# Patient Record
Sex: MALE | Race: WHITE | NOT HISPANIC OR LATINO
[De-identification: names, ages, dates, MRNs, and addresses within clinical notes are randomized per-mention and may not be internally consistent; named-entity substitution may affect disease eponyms.]

---

## 2017-01-24 ENCOUNTER — APPOINTMENT (OUTPATIENT)
Dept: ENDOCRINOLOGY | Facility: CLINIC | Age: 42
End: 2017-01-24

## 2017-01-24 VITALS
OXYGEN SATURATION: 99 % | HEIGHT: 66 IN | SYSTOLIC BLOOD PRESSURE: 112 MMHG | BODY MASS INDEX: 23.63 KG/M2 | DIASTOLIC BLOOD PRESSURE: 76 MMHG | HEART RATE: 73 BPM | WEIGHT: 147 LBS

## 2017-01-24 DIAGNOSIS — R19.5 OTHER FECAL ABNORMALITIES: ICD-10-CM

## 2017-01-24 LAB
GLUCOSE BLDC GLUCOMTR-MCNC: 111
HBA1C MFR BLD HPLC: 5.7

## 2017-01-25 PROBLEM — R19.5 LOOSE STOOLS: Status: ACTIVE | Noted: 2017-01-25

## 2017-05-08 ENCOUNTER — APPOINTMENT (OUTPATIENT)
Dept: ENDOCRINOLOGY | Facility: CLINIC | Age: 42
End: 2017-05-08

## 2017-05-08 VITALS
WEIGHT: 146 LBS | BODY MASS INDEX: 23.46 KG/M2 | HEIGHT: 66 IN | SYSTOLIC BLOOD PRESSURE: 114 MMHG | HEART RATE: 70 BPM | DIASTOLIC BLOOD PRESSURE: 76 MMHG | OXYGEN SATURATION: 98 %

## 2017-05-08 LAB
GLUCOSE BLDC GLUCOMTR-MCNC: 130
HBA1C MFR BLD HPLC: 6.1

## 2017-05-12 LAB
ANION GAP SERPL CALC-SCNC: 25 MMOL/L
BUN SERPL-MCNC: 21 MG/DL
CALCIUM SERPL-MCNC: 10 MG/DL
CHLORIDE SERPL-SCNC: 99 MMOL/L
CO2 SERPL-SCNC: 19 MMOL/L
CREAT SERPL-MCNC: 1.03 MG/DL
GLUCOSE SERPL-MCNC: 119 MG/DL
POTASSIUM SERPL-SCNC: 4.5 MMOL/L
SODIUM SERPL-SCNC: 143 MMOL/L

## 2017-05-16 ENCOUNTER — MEDICATION RENEWAL (OUTPATIENT)
Age: 42
End: 2017-05-16

## 2017-05-23 ENCOUNTER — MEDICATION RENEWAL (OUTPATIENT)
Age: 42
End: 2017-05-23

## 2017-06-29 ENCOUNTER — MEDICATION RENEWAL (OUTPATIENT)
Age: 42
End: 2017-06-29

## 2017-06-30 ENCOUNTER — MEDICATION RENEWAL (OUTPATIENT)
Age: 42
End: 2017-06-30

## 2017-06-30 RX ORDER — LANCETS 33 GAUGE
EACH MISCELLANEOUS
Qty: 6 | Refills: 3 | Status: DISCONTINUED | COMMUNITY
Start: 2017-06-29 | End: 2017-06-30

## 2017-08-28 ENCOUNTER — APPOINTMENT (OUTPATIENT)
Dept: ENDOCRINOLOGY | Facility: CLINIC | Age: 42
End: 2017-08-28
Payer: COMMERCIAL

## 2017-08-28 VITALS
DIASTOLIC BLOOD PRESSURE: 64 MMHG | HEART RATE: 74 BPM | HEIGHT: 66 IN | OXYGEN SATURATION: 98 % | SYSTOLIC BLOOD PRESSURE: 100 MMHG

## 2017-08-28 LAB
GLUCOSE BLDC GLUCOMTR-MCNC: 141
HBA1C MFR BLD HPLC: 5.9

## 2017-08-28 PROCEDURE — 82962 GLUCOSE BLOOD TEST: CPT

## 2017-08-28 PROCEDURE — 99214 OFFICE O/P EST MOD 30 MIN: CPT | Mod: 25

## 2017-08-28 PROCEDURE — 95251 CONT GLUC MNTR ANALYSIS I&R: CPT

## 2017-08-28 PROCEDURE — 83036 HEMOGLOBIN GLYCOSYLATED A1C: CPT | Mod: QW

## 2017-09-05 ENCOUNTER — MEDICATION RENEWAL (OUTPATIENT)
Age: 42
End: 2017-09-05

## 2017-09-24 ENCOUNTER — MEDICATION RENEWAL (OUTPATIENT)
Age: 42
End: 2017-09-24

## 2017-10-09 ENCOUNTER — OTHER (OUTPATIENT)
Age: 42
End: 2017-10-09

## 2017-10-16 RX ORDER — INSULIN DEGLUDEC INJECTION 200 U/ML
200 INJECTION, SOLUTION SUBCUTANEOUS
Qty: 1 | Refills: 2 | Status: DISCONTINUED | COMMUNITY
Start: 2017-05-16 | End: 2017-10-16

## 2017-11-06 ENCOUNTER — RX RENEWAL (OUTPATIENT)
Age: 42
End: 2017-11-06

## 2017-11-07 ENCOUNTER — RX RENEWAL (OUTPATIENT)
Age: 42
End: 2017-11-07

## 2017-11-10 ENCOUNTER — CLINICAL ADVICE (OUTPATIENT)
Age: 42
End: 2017-11-10

## 2017-11-21 ENCOUNTER — MEDICATION RENEWAL (OUTPATIENT)
Age: 42
End: 2017-11-21

## 2017-11-28 ENCOUNTER — APPOINTMENT (OUTPATIENT)
Dept: ENDOCRINOLOGY | Facility: CLINIC | Age: 42
End: 2017-11-28
Payer: COMMERCIAL

## 2017-11-28 VITALS
WEIGHT: 145 LBS | HEIGHT: 66 IN | OXYGEN SATURATION: 98 % | DIASTOLIC BLOOD PRESSURE: 72 MMHG | HEART RATE: 77 BPM | BODY MASS INDEX: 23.3 KG/M2 | SYSTOLIC BLOOD PRESSURE: 110 MMHG

## 2017-11-28 LAB
GLUCOSE BLDC GLUCOMTR-MCNC: 105
HBA1C MFR BLD HPLC: 5.7

## 2017-11-28 PROCEDURE — 99214 OFFICE O/P EST MOD 30 MIN: CPT | Mod: 25

## 2017-11-28 PROCEDURE — 82962 GLUCOSE BLOOD TEST: CPT

## 2017-11-28 PROCEDURE — 95251 CONT GLUC MNTR ANALYSIS I&R: CPT

## 2017-11-28 PROCEDURE — 83036 HEMOGLOBIN GLYCOSYLATED A1C: CPT | Mod: QW

## 2017-11-29 LAB
CHOLEST SERPL-MCNC: 145 MG/DL
CHOLEST/HDLC SERPL: 1.8 RATIO
CREAT SPEC-SCNC: 114 MG/DL
HDLC SERPL-MCNC: 82 MG/DL
LDLC SERPL CALC-MCNC: 53 MG/DL
MICROALBUMIN 24H UR DL<=1MG/L-MCNC: 0.8 MG/DL
MICROALBUMIN/CREAT 24H UR-RTO: 7 MG/G
TRIGL SERPL-MCNC: 49 MG/DL
TSH SERPL-ACNC: 1.47 UIU/ML

## 2017-12-18 LAB
ALBUMIN SERPL ELPH-MCNC: 4.8 G/DL
ALP BLD-CCNC: 101 U/L
ALT SERPL-CCNC: 33 U/L
ANION GAP SERPL CALC-SCNC: 17 MMOL/L
AST SERPL-CCNC: 27 U/L
BILIRUB SERPL-MCNC: 0.7 MG/DL
BUN SERPL-MCNC: 22 MG/DL
CALCIUM SERPL-MCNC: 10 MG/DL
CHLORIDE SERPL-SCNC: 99 MMOL/L
CO2 SERPL-SCNC: 27 MMOL/L
CREAT SERPL-MCNC: 1.2 MG/DL
GLUCOSE SERPL-MCNC: 66 MG/DL
POTASSIUM SERPL-SCNC: 4.2 MMOL/L
PROT SERPL-MCNC: 7.1 G/DL
SODIUM SERPL-SCNC: 143 MMOL/L

## 2017-12-26 ENCOUNTER — CLINICAL ADVICE (OUTPATIENT)
Age: 42
End: 2017-12-26

## 2018-01-22 ENCOUNTER — MEDICATION RENEWAL (OUTPATIENT)
Age: 43
End: 2018-01-22

## 2018-02-07 ENCOUNTER — RX RENEWAL (OUTPATIENT)
Age: 43
End: 2018-02-07

## 2018-02-08 ENCOUNTER — OTHER (OUTPATIENT)
Age: 43
End: 2018-02-08

## 2018-02-08 RX ORDER — INSULIN PEN,REUSABLE,BT LISPRO
INSULIN PEN (EA) SUBCUTANEOUS
Qty: 1 | Refills: 0 | Status: ACTIVE | COMMUNITY
Start: 2018-02-07

## 2018-02-14 ENCOUNTER — APPOINTMENT (OUTPATIENT)
Dept: ENDOCRINOLOGY | Facility: CLINIC | Age: 43
End: 2018-02-14
Payer: COMMERCIAL

## 2018-02-14 ENCOUNTER — RESULT CHARGE (OUTPATIENT)
Age: 43
End: 2018-02-14

## 2018-02-14 LAB
GLUCOSE BLDC GLUCOMTR-MCNC: 134
HBA1C MFR BLD HPLC: 5.7

## 2018-02-14 PROCEDURE — 83036 HEMOGLOBIN GLYCOSYLATED A1C: CPT | Mod: QW

## 2018-02-14 PROCEDURE — G0108 DIAB MANAGE TRN  PER INDIV: CPT

## 2018-02-14 PROCEDURE — 82962 GLUCOSE BLOOD TEST: CPT

## 2018-02-14 RX ORDER — BLOOD-GLUCOSE SENSOR
EACH MISCELLANEOUS
Qty: 4 | Refills: 3 | Status: ACTIVE | COMMUNITY
Start: 2018-01-22 | End: 1900-01-01

## 2018-02-14 RX ORDER — BLOOD-GLUCOSE TRANSMITTER
EACH MISCELLANEOUS
Qty: 1 | Refills: 3 | Status: ACTIVE | COMMUNITY
Start: 2017-09-05 | End: 1900-01-01

## 2018-02-28 ENCOUNTER — RX RENEWAL (OUTPATIENT)
Age: 43
End: 2018-02-28

## 2018-02-28 ENCOUNTER — OTHER (OUTPATIENT)
Age: 43
End: 2018-02-28

## 2018-02-28 RX ORDER — INSULIN DEGLUDEC INJECTION 100 U/ML
100 INJECTION, SOLUTION SUBCUTANEOUS
Qty: 1 | Refills: 3 | Status: ACTIVE | COMMUNITY
Start: 2017-10-09 | End: 1900-01-01

## 2018-03-14 ENCOUNTER — RESULT REVIEW (OUTPATIENT)
Age: 43
End: 2018-03-14

## 2018-04-03 ENCOUNTER — RX RENEWAL (OUTPATIENT)
Age: 43
End: 2018-04-03

## 2018-04-04 ENCOUNTER — CLINICAL ADVICE (OUTPATIENT)
Age: 43
End: 2018-04-04

## 2018-05-22 ENCOUNTER — APPOINTMENT (OUTPATIENT)
Dept: ENDOCRINOLOGY | Facility: CLINIC | Age: 43
End: 2018-05-22
Payer: COMMERCIAL

## 2018-05-22 VITALS
OXYGEN SATURATION: 98 % | HEART RATE: 78 BPM | SYSTOLIC BLOOD PRESSURE: 110 MMHG | BODY MASS INDEX: 23.3 KG/M2 | HEIGHT: 66 IN | WEIGHT: 145 LBS | DIASTOLIC BLOOD PRESSURE: 76 MMHG

## 2018-05-22 LAB
GLUCOSE BLDC GLUCOMTR-MCNC: 185
HBA1C MFR BLD HPLC: 5.6

## 2018-05-22 PROCEDURE — 95251 CONT GLUC MNTR ANALYSIS I&R: CPT

## 2018-05-22 PROCEDURE — 82962 GLUCOSE BLOOD TEST: CPT

## 2018-05-22 PROCEDURE — 99214 OFFICE O/P EST MOD 30 MIN: CPT | Mod: 25

## 2018-05-22 PROCEDURE — 83036 HEMOGLOBIN GLYCOSYLATED A1C: CPT | Mod: QW

## 2018-05-22 RX ORDER — ASCORBIC ACID 500 MG
TABLET ORAL
Refills: 0 | Status: ACTIVE | COMMUNITY

## 2018-05-22 RX ORDER — FINASTERIDE 1 MG/1
TABLET, FILM COATED ORAL
Refills: 0 | Status: ACTIVE | COMMUNITY

## 2018-05-22 RX ORDER — MONTELUKAST 10 MG/1
10 TABLET, FILM COATED ORAL
Refills: 0 | Status: ACTIVE | COMMUNITY

## 2018-05-25 LAB
25(OH)D3 SERPL-MCNC: 15.9 NG/ML
ANION GAP SERPL CALC-SCNC: 14 MMOL/L
BUN SERPL-MCNC: 22 MG/DL
CALCIUM SERPL-MCNC: 9.6 MG/DL
CHLORIDE SERPL-SCNC: 99 MMOL/L
CO2 SERPL-SCNC: 27 MMOL/L
CREAT SERPL-MCNC: 1.13 MG/DL
GLUCOSE SERPL-MCNC: 154 MG/DL
POTASSIUM SERPL-SCNC: 4.4 MMOL/L
SODIUM SERPL-SCNC: 140 MMOL/L

## 2018-05-25 RX ORDER — ERGOCALCIFEROL 1.25 MG/1
1.25 MG CAPSULE, LIQUID FILLED ORAL
Qty: 13 | Refills: 3 | Status: ACTIVE | COMMUNITY
Start: 2018-05-25 | End: 1900-01-01

## 2018-06-08 ENCOUNTER — MEDICATION RENEWAL (OUTPATIENT)
Age: 43
End: 2018-06-08

## 2018-06-18 ENCOUNTER — RX RENEWAL (OUTPATIENT)
Age: 43
End: 2018-06-18

## 2018-07-02 ENCOUNTER — RX RENEWAL (OUTPATIENT)
Age: 43
End: 2018-07-02

## 2018-07-02 RX ORDER — LANCETS 30 GAUGE
EACH MISCELLANEOUS
Qty: 600 | Refills: 3 | Status: ACTIVE | COMMUNITY
Start: 2018-07-02 | End: 1900-01-01

## 2018-08-28 ENCOUNTER — RX RENEWAL (OUTPATIENT)
Age: 43
End: 2018-08-28

## 2018-09-06 ENCOUNTER — APPOINTMENT (OUTPATIENT)
Dept: ENDOCRINOLOGY | Facility: CLINIC | Age: 43
End: 2018-09-06
Payer: COMMERCIAL

## 2018-09-06 VITALS
WEIGHT: 144 LBS | DIASTOLIC BLOOD PRESSURE: 72 MMHG | BODY MASS INDEX: 23.14 KG/M2 | HEIGHT: 66 IN | HEART RATE: 80 BPM | SYSTOLIC BLOOD PRESSURE: 110 MMHG | OXYGEN SATURATION: 98 %

## 2018-09-06 LAB
GLUCOSE BLDC GLUCOMTR-MCNC: 166
HBA1C MFR BLD HPLC: 5.8

## 2018-09-06 PROCEDURE — 82962 GLUCOSE BLOOD TEST: CPT

## 2018-09-06 PROCEDURE — 83036 HEMOGLOBIN GLYCOSYLATED A1C: CPT | Mod: QW

## 2018-09-06 PROCEDURE — 99214 OFFICE O/P EST MOD 30 MIN: CPT | Mod: 25

## 2018-10-25 ENCOUNTER — MEDICATION RENEWAL (OUTPATIENT)
Age: 43
End: 2018-10-25

## 2018-12-06 ENCOUNTER — APPOINTMENT (OUTPATIENT)
Dept: ENDOCRINOLOGY | Facility: CLINIC | Age: 43
End: 2018-12-06
Payer: COMMERCIAL

## 2018-12-06 VITALS
HEART RATE: 72 BPM | OXYGEN SATURATION: 98 % | DIASTOLIC BLOOD PRESSURE: 70 MMHG | HEIGHT: 66 IN | SYSTOLIC BLOOD PRESSURE: 120 MMHG | BODY MASS INDEX: 23.14 KG/M2 | WEIGHT: 144 LBS

## 2018-12-06 LAB
GLUCOSE BLDC GLUCOMTR-MCNC: 164
HBA1C MFR BLD HPLC: 5.3

## 2018-12-06 PROCEDURE — 99214 OFFICE O/P EST MOD 30 MIN: CPT | Mod: 25

## 2018-12-06 PROCEDURE — 82962 GLUCOSE BLOOD TEST: CPT

## 2018-12-06 PROCEDURE — 83036 HEMOGLOBIN GLYCOSYLATED A1C: CPT | Mod: QW

## 2018-12-06 PROCEDURE — 95251 CONT GLUC MNTR ANALYSIS I&R: CPT

## 2018-12-10 LAB
25(OH)D3 SERPL-MCNC: 33.3 NG/ML
ALBUMIN SERPL ELPH-MCNC: 4.9 G/DL
ALP BLD-CCNC: 119 U/L
ALT SERPL-CCNC: 30 U/L
ANION GAP SERPL CALC-SCNC: 10 MMOL/L
AST SERPL-CCNC: 27 U/L
BILIRUB SERPL-MCNC: 0.6 MG/DL
BUN SERPL-MCNC: 21 MG/DL
CALCIUM SERPL-MCNC: 9.9 MG/DL
CHLORIDE SERPL-SCNC: 101 MMOL/L
CHOLEST SERPL-MCNC: 133 MG/DL
CHOLEST/HDLC SERPL: 1.6 RATIO
CO2 SERPL-SCNC: 30 MMOL/L
CREAT SERPL-MCNC: 0.9 MG/DL
CREAT SPEC-SCNC: 119 MG/DL
GLUCOSE SERPL-MCNC: 135 MG/DL
HDLC SERPL-MCNC: 81 MG/DL
LDLC SERPL CALC-MCNC: 39 MG/DL
MICROALBUMIN 24H UR DL<=1MG/L-MCNC: <1.2 MG/DL
MICROALBUMIN/CREAT 24H UR-RTO: NORMAL
POTASSIUM SERPL-SCNC: 4.1 MMOL/L
PROT SERPL-MCNC: 7 G/DL
SODIUM SERPL-SCNC: 140 MMOL/L
TRIGL SERPL-MCNC: 65 MG/DL
TSH SERPL-ACNC: 1.5 UIU/ML

## 2019-01-17 ENCOUNTER — CLINICAL ADVICE (OUTPATIENT)
Age: 44
End: 2019-01-17

## 2019-01-18 ENCOUNTER — CLINICAL ADVICE (OUTPATIENT)
Age: 44
End: 2019-01-18

## 2019-02-14 ENCOUNTER — APPOINTMENT (OUTPATIENT)
Dept: ENDOCRINOLOGY | Facility: CLINIC | Age: 44
End: 2019-02-14

## 2019-03-07 ENCOUNTER — APPOINTMENT (OUTPATIENT)
Dept: ENDOCRINOLOGY | Facility: CLINIC | Age: 44
End: 2019-03-07
Payer: COMMERCIAL

## 2019-03-07 VITALS
DIASTOLIC BLOOD PRESSURE: 74 MMHG | SYSTOLIC BLOOD PRESSURE: 120 MMHG | BODY MASS INDEX: 22.98 KG/M2 | HEIGHT: 66 IN | WEIGHT: 143 LBS | OXYGEN SATURATION: 98 % | HEART RATE: 69 BPM

## 2019-03-07 LAB — HBA1C MFR BLD HPLC: 5.7

## 2019-03-07 PROCEDURE — 99214 OFFICE O/P EST MOD 30 MIN: CPT | Mod: 25

## 2019-03-07 PROCEDURE — 82962 GLUCOSE BLOOD TEST: CPT

## 2019-03-07 PROCEDURE — 83036 HEMOGLOBIN GLYCOSYLATED A1C: CPT | Mod: QW

## 2019-03-07 PROCEDURE — 95251 CONT GLUC MNTR ANALYSIS I&R: CPT

## 2019-03-07 NOTE — HISTORY OF PRESENT ILLNESS
[FreeTextEntry1] : cc: diabetes\par \par 44 year old man with T1DM, diagnosed in 3/2016, on basal bolus insulin with Tresiba 9 units and humalog i:c 1:15,finds half-unit dosing very helpful.  Does not want pump.  Using Dexcom sensor, still having some difficulty with the G6,  less pain, still does not last the full 10 days.     He checks blood glucose    3-8 times daily.  Values have generally been 90 - 150 mg/dl.  Has rare hypoglycemia, does not always feel it.   Limits carbohydrate intake.  Exercises most days ( less so in the past few months because traveling.  Saw optho  in the spring, no retinopathy. Had flu shot \par \par \par Hyperlipidemia - taking statin, reports no side effects\par \par Vit D def'y - taking weekly supplement\par \par Will be moving to [place] this summer\par \par

## 2019-03-07 NOTE — ASSESSMENT
[FreeTextEntry1] : \par 44 year old man with T1DM, well controlled, \par   - Dexcom sensor downloaded and reviewed. Pt with good glycemic control, has occasional postprandial elevations after dinner, recommend monitor diet and consider increased insulin for specific foods/meals that lead to hyperglycemia, otherwise Continue current insulin regimen. \par   - blood pressure at goal, microalbumin neg\par   - has glucagon, ketone strips\par    - retinopathy screening up to date, no retinopathy\par  - had flu shot\par \par  Hyperlipidemia -  continue atorvastatin\par \par VItamin D def'y - decrease vitamin D to 1000 units daily, repeat level next visit\par \par f/u 3 months \par

## 2019-03-08 ENCOUNTER — RESULT CHARGE (OUTPATIENT)
Age: 44
End: 2019-03-08

## 2019-03-08 LAB — GLUCOSE BLDC GLUCOMTR-MCNC: 181

## 2019-03-14 ENCOUNTER — RX RENEWAL (OUTPATIENT)
Age: 44
End: 2019-03-14

## 2019-04-21 ENCOUNTER — TRANSCRIPTION ENCOUNTER (OUTPATIENT)
Age: 44
End: 2019-04-21

## 2019-06-11 ENCOUNTER — APPOINTMENT (OUTPATIENT)
Dept: ENDOCRINOLOGY | Facility: CLINIC | Age: 44
End: 2019-06-11
Payer: COMMERCIAL

## 2019-06-11 VITALS
BODY MASS INDEX: 23.3 KG/M2 | DIASTOLIC BLOOD PRESSURE: 60 MMHG | HEIGHT: 66 IN | OXYGEN SATURATION: 98 % | WEIGHT: 145 LBS | HEART RATE: 65 BPM | SYSTOLIC BLOOD PRESSURE: 110 MMHG

## 2019-06-11 DIAGNOSIS — E55.9 VITAMIN D DEFICIENCY, UNSPECIFIED: ICD-10-CM

## 2019-06-11 DIAGNOSIS — E78.5 HYPERLIPIDEMIA, UNSPECIFIED: ICD-10-CM

## 2019-06-11 DIAGNOSIS — E10.9 TYPE 1 DIABETES MELLITUS W/OUT COMPLICATIONS: ICD-10-CM

## 2019-06-11 DIAGNOSIS — Z00.00 ENCOUNTER FOR GENERAL ADULT MEDICAL EXAMINATION W/OUT ABNORMAL FINDINGS: ICD-10-CM

## 2019-06-11 PROCEDURE — 95251 CONT GLUC MNTR ANALYSIS I&R: CPT

## 2019-06-11 PROCEDURE — 83036 HEMOGLOBIN GLYCOSYLATED A1C: CPT | Mod: QW

## 2019-06-11 PROCEDURE — 99214 OFFICE O/P EST MOD 30 MIN: CPT | Mod: 25

## 2019-06-11 PROCEDURE — 82962 GLUCOSE BLOOD TEST: CPT

## 2019-06-12 PROBLEM — E55.9 VITAMIN D INSUFFICIENCY: Status: ACTIVE | Noted: 2018-05-25

## 2019-06-12 LAB
25(OH)D3 SERPL-MCNC: 24.9 NG/ML
ALBUMIN SERPL ELPH-MCNC: 4.9 G/DL
ALP BLD-CCNC: 98 U/L
ALT SERPL-CCNC: 27 U/L
ANION GAP SERPL CALC-SCNC: 14 MMOL/L
AST SERPL-CCNC: 31 U/L
BILIRUB SERPL-MCNC: 0.7 MG/DL
BUN SERPL-MCNC: 25 MG/DL
CALCIUM SERPL-MCNC: 9.6 MG/DL
CHLORIDE SERPL-SCNC: 101 MMOL/L
CHOLEST SERPL-MCNC: 124 MG/DL
CHOLEST/HDLC SERPL: 1.6 RATIO
CO2 SERPL-SCNC: 27 MMOL/L
CREAT SERPL-MCNC: 1.06 MG/DL
GLUCOSE BLDC GLUCOMTR-MCNC: 97
GLUCOSE SERPL-MCNC: 94 MG/DL
HBA1C MFR BLD HPLC: 5.5
HDLC SERPL-MCNC: 78 MG/DL
LDLC SERPL CALC-MCNC: 36 MG/DL
MEV IGG FLD QL IA: 194 AU/ML
MEV IGG+IGM SER-IMP: POSITIVE
POTASSIUM SERPL-SCNC: 4.2 MMOL/L
PROT SERPL-MCNC: 6.5 G/DL
SODIUM SERPL-SCNC: 142 MMOL/L
TRIGL SERPL-MCNC: 52 MG/DL

## 2019-06-12 NOTE — ASSESSMENT
[FreeTextEntry1] : \par 44 year old man with T1DM, well controlled, \par   - Reviewed hypoglycemia, (beers, exercise,) continue current settings\par \par \par   - Dexcom sensor downloaded and reviewed. Pt with excellent glycemic control, has occasional hypoglycemia ( after exercise, evening beer)  reviewed effects of alcohol and exercise on blood glucose.  Continue current insulin regimen. \par   - blood pressure at goal, microalbumin neg\par   - has glucagon, ketone strips\par    - retinopathy screening up to date, no retinopathy\par  - had flu shot\par  - check cmp\par \par  Hyperlipidemia -  continue atorvastatin\par \par VItamin D def'y - check vitamin D\par \par Health maint - check measles titer\par \par f/u 3 months \par

## 2019-06-12 NOTE — PHYSICAL EXAM
[Alert] : alert [No Acute Distress] : no acute distress [Well Developed] : well developed [Normal Sclera/Conjunctiva] : normal sclera/conjunctiva [Well Nourished] : well nourished [No LAD] : no lymphadenopathy [No Proptosis] : no proptosis [Thyroid Not Enlarged] : the thyroid was not enlarged [Regular Rhythm] : with a regular rhythm [Normal S1, S2] : normal S1 and S2 [Pedal Pulses Normal] : the pedal pulses are present [No Edema] : there was no peripheral edema [Normal Bowel Sounds] : normal bowel sounds [Not Tender] : non-tender [Soft] : abdomen soft [No Spinal Tenderness] : no spinal tenderness [Normal Strength/Tone] : muscle strength and tone were normal [Right Foot Was Examined] : right foot ~C was examined [No Tremors] : no tremors [Normal Reflexes] : deep tendon reflexes were 2+ and symmetric [Left Foot Was Examined] : left foot ~C was examined [Normal Affect] : the affect was normal [Normal Mood] : the mood was normal [Normal Sensation on Monofilament Testing] : normal sensation on monofilament testing of lower extremities [Foot Ulcers] : no foot ulcers

## 2019-06-12 NOTE — HISTORY OF PRESENT ILLNESS
[FreeTextEntry1] : cc: diabetes\par \par 44 year old man with T1DM, diagnosed in 3/2016, on basal bolus insulin, does not want pump, using Tresiba 9 units and humalog i:c 1:15,(finds half-unit dosing helpful).  Does not want pump.  Using Dexcom sensor, now doing better with the G6,  has tried new technique, angles it. . Has been 'in range+ %.  Has not been traveling and his routine has been stable.  Almost no hypoglycemia, had one episode yesterday when he played tennis at night.   Limits carbohydrate intake.  Exercises every day   Saw optho earlier this year, no retinopathy. Had flu shot. \par \par \par Hyperlipidemia - taking statin, reports no side effects\par \par Vit D def'y - taking vitamin D daily supplements\par \par Will be moving to [place] July 4th ( will be returning for appts)\par \par

## 2019-07-29 ENCOUNTER — MEDICATION RENEWAL (OUTPATIENT)
Age: 44
End: 2019-07-29

## 2019-07-29 RX ORDER — LANCETS 33 GAUGE
EACH MISCELLANEOUS
Qty: 900 | Refills: 3 | Status: ACTIVE | COMMUNITY
Start: 2019-07-29 | End: 1900-01-01

## 2019-07-29 RX ORDER — INSULIN LISPRO 100 [IU]/ML
100 INJECTION, SOLUTION SUBCUTANEOUS
Qty: 2 | Refills: 3 | Status: ACTIVE | COMMUNITY
Start: 2017-05-23 | End: 1900-01-01

## 2019-07-29 RX ORDER — INSULIN DEGLUDEC INJECTION 100 U/ML
100 INJECTION, SOLUTION SUBCUTANEOUS
Qty: 2 | Refills: 3 | Status: ACTIVE | COMMUNITY
Start: 2018-02-28 | End: 1900-01-01

## 2019-10-07 ENCOUNTER — APPOINTMENT (OUTPATIENT)
Dept: ENDOCRINOLOGY | Facility: CLINIC | Age: 44
End: 2019-10-07

## 2019-10-21 ENCOUNTER — MEDICATION RENEWAL (OUTPATIENT)
Age: 44
End: 2019-10-21

## 2020-02-25 ENCOUNTER — RX RENEWAL (OUTPATIENT)
Age: 45
End: 2020-02-25

## 2020-08-11 ENCOUNTER — RX RENEWAL (OUTPATIENT)
Age: 45
End: 2020-08-11

## 2020-11-07 ENCOUNTER — RX RENEWAL (OUTPATIENT)
Age: 45
End: 2020-11-07

## 2021-08-11 ENCOUNTER — NON-APPOINTMENT (OUTPATIENT)
Age: 46
End: 2021-08-11

## 2021-08-16 ENCOUNTER — APPOINTMENT (OUTPATIENT)
Dept: ORTHOPEDIC SURGERY | Facility: CLINIC | Age: 46
End: 2021-08-16
Payer: COMMERCIAL

## 2021-08-16 ENCOUNTER — NON-APPOINTMENT (OUTPATIENT)
Age: 46
End: 2021-08-16

## 2021-08-16 VITALS
DIASTOLIC BLOOD PRESSURE: 72 MMHG | WEIGHT: 150 LBS | BODY MASS INDEX: 24.11 KG/M2 | HEIGHT: 66 IN | HEART RATE: 56 BPM | SYSTOLIC BLOOD PRESSURE: 117 MMHG

## 2021-08-16 DIAGNOSIS — Z87.2 PERSONAL HISTORY OF DISEASES OF THE SKIN AND SUBCUTANEOUS TISSUE: ICD-10-CM

## 2021-08-16 DIAGNOSIS — M77.8 OTHER ENTHESOPATHIES, NOT ELSEWHERE CLASSIFIED: ICD-10-CM

## 2021-08-16 DIAGNOSIS — Z83.3 FAMILY HISTORY OF DIABETES MELLITUS: ICD-10-CM

## 2021-08-16 DIAGNOSIS — Z86.39 PERSONAL HISTORY OF OTHER ENDOCRINE, NUTRITIONAL AND METABOLIC DISEASE: ICD-10-CM

## 2021-08-16 DIAGNOSIS — M25.552 PAIN IN LEFT HIP: ICD-10-CM

## 2021-08-16 DIAGNOSIS — M54.5 LOW BACK PAIN: ICD-10-CM

## 2021-08-16 DIAGNOSIS — Z82.3 FAMILY HISTORY OF STROKE: ICD-10-CM

## 2021-08-16 PROCEDURE — 99204 OFFICE O/P NEW MOD 45 MIN: CPT

## 2021-08-16 PROCEDURE — 72170 X-RAY EXAM OF PELVIS: CPT | Mod: 59

## 2021-08-16 PROCEDURE — 72110 X-RAY EXAM L-2 SPINE 4/>VWS: CPT

## 2021-08-16 RX ORDER — AZITHROMYCIN 250 MG/1
250 TABLET, FILM COATED ORAL
Qty: 6 | Refills: 0 | Status: ACTIVE | COMMUNITY
Start: 2021-06-16

## 2021-08-16 RX ORDER — MOMETASONE FUROATE 1 MG/G
0.1 OINTMENT TOPICAL
Qty: 45 | Refills: 0 | Status: ACTIVE | COMMUNITY
Start: 2021-05-18

## 2021-08-16 RX ORDER — CLOBETASOL PROPIONATE 0.5 MG/ML
0.05 SOLUTION TOPICAL
Qty: 25 | Refills: 0 | Status: ACTIVE | COMMUNITY
Start: 2021-05-18

## 2021-08-16 RX ORDER — CLOBETASOL PROPIONATE 0.5 MG/G
0.05 CREAM TOPICAL
Qty: 15 | Refills: 0 | Status: ACTIVE | COMMUNITY
Start: 2021-05-18

## 2021-08-16 RX ORDER — FLUTICASONE PROPIONATE 50 UG/1
50 SPRAY, METERED NASAL
Qty: 16 | Refills: 0 | Status: ACTIVE | COMMUNITY
Start: 2021-06-16

## 2021-08-16 RX ORDER — CLOTRIMAZOLE AND BETAMETHASONE DIPROPIONATE 10; .5 MG/G; MG/G
1-0.05 CREAM TOPICAL
Qty: 15 | Refills: 0 | Status: ACTIVE | COMMUNITY
Start: 2021-04-06

## 2021-08-16 RX ORDER — UREA 40 G/100G
40 CREAM TOPICAL
Qty: 85 | Refills: 0 | Status: ACTIVE | COMMUNITY
Start: 2021-04-06

## 2021-08-17 PROBLEM — M77.8 ENTHESOPATHY OF PELVIS: Status: ACTIVE | Noted: 2021-08-17

## 2021-09-01 ENCOUNTER — APPOINTMENT (OUTPATIENT)
Dept: ORTHOPEDIC SURGERY | Facility: CLINIC | Age: 46
End: 2021-09-01
Payer: COMMERCIAL

## 2021-09-01 VITALS
HEART RATE: 49 BPM | DIASTOLIC BLOOD PRESSURE: 68 MMHG | SYSTOLIC BLOOD PRESSURE: 110 MMHG | HEIGHT: 66 IN | BODY MASS INDEX: 24.11 KG/M2 | WEIGHT: 150 LBS

## 2021-09-01 DIAGNOSIS — M94.252 CHONDROMALACIA, LEFT HIP: ICD-10-CM

## 2021-09-01 DIAGNOSIS — M51.36 OTHER INTERVERTEBRAL DISC DEGENERATION, LUMBAR REGION: ICD-10-CM

## 2021-09-01 PROCEDURE — 99214 OFFICE O/P EST MOD 30 MIN: CPT

## 2021-09-02 ENCOUNTER — RX RENEWAL (OUTPATIENT)
Age: 46
End: 2021-09-02

## 2021-09-02 RX ORDER — BLOOD SUGAR DIAGNOSTIC
STRIP MISCELLANEOUS
Qty: 600 | Refills: 5 | Status: ACTIVE | COMMUNITY
Start: 2019-07-29 | End: 1900-01-01

## 2023-12-27 ENCOUNTER — APPOINTMENT (OUTPATIENT)
Dept: PULMONOLOGY | Facility: CLINIC | Age: 48
End: 2023-12-27

## 2024-04-19 ENCOUNTER — RESULT REVIEW (OUTPATIENT)
Age: 49
End: 2024-04-19

## 2024-04-19 ENCOUNTER — OUTPATIENT (OUTPATIENT)
Dept: OUTPATIENT SERVICES | Facility: HOSPITAL | Age: 49
LOS: 1 days | End: 2024-04-19
Payer: COMMERCIAL

## 2024-04-19 ENCOUNTER — APPOINTMENT (OUTPATIENT)
Dept: RADIOLOGY | Facility: CLINIC | Age: 49
End: 2024-04-19
Payer: COMMERCIAL

## 2024-04-19 ENCOUNTER — APPOINTMENT (OUTPATIENT)
Dept: ORTHOPEDIC SURGERY | Facility: CLINIC | Age: 49
End: 2024-04-19
Payer: COMMERCIAL

## 2024-04-19 VITALS
HEIGHT: 66 IN | WEIGHT: 147 LBS | DIASTOLIC BLOOD PRESSURE: 74 MMHG | HEART RATE: 58 BPM | BODY MASS INDEX: 23.63 KG/M2 | SYSTOLIC BLOOD PRESSURE: 125 MMHG

## 2024-04-19 DIAGNOSIS — S29.9XXA UNSPECIFIED INJURY OF THORAX, INITIAL ENCOUNTER: ICD-10-CM

## 2024-04-19 PROCEDURE — 71046 X-RAY EXAM CHEST 2 VIEWS: CPT

## 2024-04-19 PROCEDURE — 71046 X-RAY EXAM CHEST 2 VIEWS: CPT | Mod: 26

## 2024-04-19 PROCEDURE — 99203 OFFICE O/P NEW LOW 30 MIN: CPT

## 2024-04-22 ENCOUNTER — NON-APPOINTMENT (OUTPATIENT)
Age: 49
End: 2024-04-22